# Patient Record
Sex: FEMALE | Race: WHITE | URBAN - NONMETROPOLITAN AREA
[De-identification: names, ages, dates, MRNs, and addresses within clinical notes are randomized per-mention and may not be internally consistent; named-entity substitution may affect disease eponyms.]

---
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
## 2024-01-17 ENCOUNTER — LAB OUTSIDE AN ENCOUNTER (OUTPATIENT)
Dept: URBAN - NONMETROPOLITAN AREA CLINIC 2 | Facility: CLINIC | Age: 19
End: 2024-01-17

## 2024-01-17 ENCOUNTER — DASHBOARD ENCOUNTERS (OUTPATIENT)
Age: 19
End: 2024-01-17

## 2024-01-17 ENCOUNTER — OFFICE VISIT (OUTPATIENT)
Dept: URBAN - NONMETROPOLITAN AREA CLINIC 2 | Facility: CLINIC | Age: 19
End: 2024-01-17
Payer: COMMERCIAL

## 2024-01-17 VITALS
HEART RATE: 92 BPM | HEIGHT: 62 IN | WEIGHT: 138 LBS | BODY MASS INDEX: 25.4 KG/M2 | SYSTOLIC BLOOD PRESSURE: 123 MMHG | DIASTOLIC BLOOD PRESSURE: 81 MMHG

## 2024-01-17 DIAGNOSIS — R10.12 LUQ PAIN: ICD-10-CM

## 2024-01-17 DIAGNOSIS — R11.0 NAUSEA: ICD-10-CM

## 2024-01-17 DIAGNOSIS — R17 ELEVATED BILIRUBIN: ICD-10-CM

## 2024-01-17 PROBLEM — 422587007: Status: ACTIVE | Noted: 2023-08-14

## 2024-01-17 PROBLEM — 26165005: Status: ACTIVE | Noted: 2023-08-14

## 2024-01-17 PROBLEM — 301715003: Status: ACTIVE | Noted: 2023-08-14

## 2024-01-17 PROCEDURE — 99213 OFFICE O/P EST LOW 20 MIN: CPT

## 2024-01-17 RX ORDER — PANTOPRAZOLE SODIUM 20 MG/1
1 TABLET TABLET, DELAYED RELEASE ORAL ONCE A DAY
Qty: 90 TABLET | Refills: 3 | OUTPATIENT
Start: 2024-01-17

## 2024-01-17 RX ORDER — FAMOTIDINE 10 MG/1
1 TABLET AT BEDTIME AS NEEDED TABLET, FILM COATED ORAL ONCE A DAY
Status: ACTIVE | COMMUNITY
Start: 2023-08-14

## 2024-01-17 RX ORDER — IRON 18 MG
0.5 TABLET TABLET ORAL
Status: ACTIVE | COMMUNITY
Start: 2023-08-14

## 2024-01-17 RX ORDER — MONTELUKAST SODIUM 10 MG/1
TABLET ORAL
Qty: 90 TABLET | Status: ACTIVE | COMMUNITY

## 2024-01-17 RX ORDER — NORETHINDRONE 0.35 MG/1
TAKE 1 TABLET BY MOUTH ONCE DAILY TABLET ORAL
Qty: 28 EACH | Refills: 3 | Status: ON HOLD | COMMUNITY

## 2024-01-17 RX ORDER — CHOLECALCIFEROL (VITAMIN D3) 50 MCG
1 TABLET TABLET ORAL ONCE A DAY
Status: ACTIVE | COMMUNITY

## 2024-01-17 RX ORDER — SALICYLIC ACID 3 G/100G
1 TABLET SWALLOW WHOLE WITH WATER; DO NOT TAKE WITH FRUIT JUICES LOTION TOPICAL ONCE A DAY
Status: ACTIVE | COMMUNITY

## 2024-01-17 RX ORDER — CHLORHEXIDINE GLUCONATE 4 %
1 TABLET AS NEEDED LIQUID (ML) TOPICAL ONCE A DAY
Status: ACTIVE | COMMUNITY

## 2024-01-17 RX ORDER — ESCITALOPRAM OXALATE 10 MG/1
TAKE ONE AND ONE-HALF TABLETS BY MOUTH EVERY MORNING TABLET ORAL
Qty: 135 UNSPECIFIED | Refills: 0 | Status: ACTIVE | COMMUNITY

## 2024-01-17 RX ORDER — ALBUTEROL SULFATE 90 UG/1
INHALE 2-4 PUFFS BY MOUTH EVERY 4 TO 6 HOURS AS NEEDED FOR COUGH , FOR WHEEZING, CHEST TIGHT AEROSOL, METERED RESPIRATORY (INHALATION)
Qty: 8.5 UNSPECIFIED | Refills: 0 | Status: ACTIVE | COMMUNITY

## 2024-01-17 RX ORDER — DEXTROAMPHETAMINE SACCHARATE, AMPHETAMINE ASPARTATE, DEXTROAMPHETAMINE SULFATE AND AMPHETAMINE SULFATE 2.5; 2.5; 2.5; 2.5 MG/1; MG/1; MG/1; MG/1
TAKE ONE TABLET BY MOUTH EVERY MORNING TABLET ORAL
Qty: 30 UNSPECIFIED | Refills: 0 | Status: ACTIVE | COMMUNITY

## 2024-01-17 NOTE — HPI-TODAY'S VISIT:
8/14/23 Nataly presents for evaluation of elevated bilirubin referred by  Beatriz Robertson NP.  A copy of this note and recommendations will be forwarded to her office. She  notes elevated bilirubin on labwork  12/2022 1.7 and 2.2 on 3/2023 No prior imaging of abdomen or fractionated bilirubin. She reports 4 months of medication adjustments after new oral contraception believed to have left her with no appetite and increased nausea/anxiety 20 lbs weight loss. She is off OCT now and gained back 10 lbs.  At times she is nauseated when she tries to eat but feels this is also her anxiety. She reports some episode with loose stool, now resolved, 1 month ago after a trip to [place].  She has 1-3 regular bowel movements daily now. She is taking famotidine 10 mg daily for acid suppression. She has a LUQ pain that is intermittent, random with now triggers.  Denies dysphagia, odynophagia or any observance of melena. She does endorse use of marijuana, no NSAIDs, tobacco use EtOH or other drugs. She denies any family history of gastrointestinal cancers. Works full time at [employer], owns many reptiles including bearded dragons and snakes. SP  10/18/2023 Nataly returns to clinic for follow up with elevated bilirubin. Her labs have normalized and she had US of RUQ showing GB sludge only. She has improved symptoms after switching oral contraception and increasing her famotidine. She feels much better today and will return prn, continue management with her pediatric office. SP 1/17/2024 Gene returns to clinic for follow-up with left upper quadrant pain.  Previously she had decided to return as needed however 2 months ago she had a return of these intermittent episodes.  She is not able to track it with any dietary flares.  She did just recently have lab work done with her OB/GYN.  She is no longer working at [employer], lost her job because her anxiety was limiting her from being able to attend work regularly.  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 Previously her nausea had improved however she has been adjusting when she takes Lexapro and is doing well taking it at night but does feel some nausea and will vomit if she eats too early in the mornings.  She has not lost weight, has returned to her baseline weight. Today we discussed completing gallbladder workup with HIDA scan and starting a proton pump inhibitor for possible gastritis or peptic ulcer.  If she does not improve we will discuss EGD with biopsies. ESTEFANY

## 2024-01-18 ENCOUNTER — TELEPHONE ENCOUNTER (OUTPATIENT)
Dept: URBAN - NONMETROPOLITAN AREA CLINIC 2 | Facility: CLINIC | Age: 19
End: 2024-01-18

## 2024-01-29 ENCOUNTER — TELEPHONE ENCOUNTER (OUTPATIENT)
Dept: URBAN - NONMETROPOLITAN AREA CLINIC 2 | Facility: CLINIC | Age: 19
End: 2024-01-29

## 2024-03-20 ENCOUNTER — OV EP (OUTPATIENT)
Dept: URBAN - NONMETROPOLITAN AREA CLINIC 2 | Facility: CLINIC | Age: 19
End: 2024-03-20

## 2024-03-20 RX ORDER — ESCITALOPRAM OXALATE 10 MG/1
TAKE ONE AND ONE-HALF TABLETS BY MOUTH EVERY MORNING TABLET ORAL
Qty: 135 UNSPECIFIED | Refills: 0 | Status: ACTIVE | COMMUNITY

## 2024-03-20 RX ORDER — NORETHINDRONE 0.35 MG/1
TAKE 1 TABLET BY MOUTH ONCE DAILY TABLET ORAL
Qty: 28 EACH | Refills: 3 | Status: ON HOLD | COMMUNITY

## 2024-03-20 RX ORDER — PANTOPRAZOLE SODIUM 20 MG/1
1 TABLET TABLET, DELAYED RELEASE ORAL ONCE A DAY
Qty: 90 TABLET | Refills: 3 | Status: ACTIVE | COMMUNITY
Start: 2024-01-17

## 2024-03-20 RX ORDER — SALICYLIC ACID 3 G/100G
1 TABLET SWALLOW WHOLE WITH WATER; DO NOT TAKE WITH FRUIT JUICES LOTION TOPICAL ONCE A DAY
Status: ACTIVE | COMMUNITY

## 2024-03-20 RX ORDER — DEXTROAMPHETAMINE SACCHARATE, AMPHETAMINE ASPARTATE, DEXTROAMPHETAMINE SULFATE AND AMPHETAMINE SULFATE 2.5; 2.5; 2.5; 2.5 MG/1; MG/1; MG/1; MG/1
TAKE ONE TABLET BY MOUTH EVERY MORNING TABLET ORAL
Qty: 30 UNSPECIFIED | Refills: 0 | Status: ACTIVE | COMMUNITY

## 2024-03-20 RX ORDER — CHOLECALCIFEROL (VITAMIN D3) 50 MCG
1 TABLET TABLET ORAL ONCE A DAY
Status: ACTIVE | COMMUNITY

## 2024-03-20 RX ORDER — FAMOTIDINE 10 MG/1
1 TABLET AT BEDTIME AS NEEDED TABLET, FILM COATED ORAL ONCE A DAY
Status: ACTIVE | COMMUNITY
Start: 2023-08-14

## 2024-03-20 RX ORDER — CHLORHEXIDINE GLUCONATE 4 %
1 TABLET AS NEEDED LIQUID (ML) TOPICAL ONCE A DAY
Status: ACTIVE | COMMUNITY

## 2024-03-20 RX ORDER — ALBUTEROL SULFATE 90 UG/1
INHALE 2-4 PUFFS BY MOUTH EVERY 4 TO 6 HOURS AS NEEDED FOR COUGH , FOR WHEEZING, CHEST TIGHT AEROSOL, METERED RESPIRATORY (INHALATION)
Qty: 8.5 UNSPECIFIED | Refills: 0 | Status: ACTIVE | COMMUNITY

## 2024-03-20 RX ORDER — IRON 18 MG
0.5 TABLET TABLET ORAL
Status: ACTIVE | COMMUNITY
Start: 2023-08-14

## 2024-03-20 RX ORDER — MONTELUKAST SODIUM 10 MG/1
TABLET ORAL
Qty: 90 TABLET | Status: ACTIVE | COMMUNITY

## 2024-03-20 NOTE — HPI-TODAY'S VISIT:
8/14/23 Nataly presents for evaluation of elevated bilirubin referred by  Beatriz Robertson NP.  A copy of this note and recommendations will be forwarded to her office. She  notes elevated bilirubin on labwork  12/2022 1.7 and 2.2 on 3/2023 No prior imaging of abdomen or fractionated bilirubin. She reports 4 months of medication adjustments after new oral contraception believed to have left her with no appetite and increased nausea/anxiety 20 lbs weight loss. She is off OCT now and gained back 10 lbs.  At times she is nauseated when she tries to eat but feels this is also her anxiety. She reports some episode with loose stool, now resolved, 1 month ago after a trip to [place].  She has 1-3 regular bowel movements daily now. She is taking famotidine 10 mg daily for acid suppression. She has a LUQ pain that is intermittent, random with now triggers.  Denies dysphagia, odynophagia or any observance of melena. She does endorse use of marijuana, no NSAIDs, tobacco use EtOH or other drugs. She denies any family history of gastrointestinal cancers. Works full time at [employer], owns many reptiles including bearded dragons and snakes. SP  10/18/2023 Nataly returns to clinic for follow up with elevated bilirubin. Her labs have normalized and she had US of RUQ showing GB sludge only. She has improved symptoms after switching oral contraception and increasing her famotidine. She feels much better today and will return prn, continue management with her pediatric office. SP 1/17/2024 Gene returns to clinic for follow-up with left upper quadrant pain.  Previously she had decided to return as needed however 2 months ago she had a return of these intermittent episodes.  She is not able to track it with any dietary flares.  She did just recently have lab work done with her OB/GYN.  She is no longer working at [employer], lost her job because her anxiety was limiting her from being able to attend work regularly.  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 Previously her nausea had improved however she has been adjusting when she takes Lexapro and is doing well taking it at night but does feel some nausea and will vomit if she eats too early in the mornings.  She has not lost weight, has returned to her baseline weight. Today we discussed completing gallbladder workup with HIDA scan and starting a proton pump inhibitor for possible gastritis or peptic ulcer.  If she does not improve we will discuss EGD with biopsies. SP 3/20/2024 follow-up ingestion Stratus.  GI symptoms including pain since starting pantoprazole 20 mg daily.  Denies any concerns with her bowel habits.  Overall she feels much better.  She also back home since her job transition out of this fall and stress management bread stating the veterinary staff discussed continuing her medicine and discussing weaning at a 3-month follow-up.  Will with GERD lifestyle recommendations.  Weight continues stable

## 2024-06-21 ENCOUNTER — OFFICE VISIT (OUTPATIENT)
Dept: URBAN - NONMETROPOLITAN AREA CLINIC 13 | Facility: CLINIC | Age: 19
End: 2024-06-21
Payer: COMMERCIAL

## 2024-06-21 VITALS
HEIGHT: 62 IN | HEART RATE: 78 BPM | SYSTOLIC BLOOD PRESSURE: 109 MMHG | BODY MASS INDEX: 26.65 KG/M2 | DIASTOLIC BLOOD PRESSURE: 74 MMHG | WEIGHT: 144.8 LBS

## 2024-06-21 DIAGNOSIS — R11.0 NAUSEA: ICD-10-CM

## 2024-06-21 DIAGNOSIS — R10.12 LUQ PAIN: ICD-10-CM

## 2024-06-21 DIAGNOSIS — R17 ELEVATED BILIRUBIN: ICD-10-CM

## 2024-06-21 PROCEDURE — 99213 OFFICE O/P EST LOW 20 MIN: CPT

## 2024-06-21 RX ORDER — IRON 18 MG
0.5 TABLET TABLET ORAL
Status: ACTIVE | COMMUNITY
Start: 2023-08-14

## 2024-06-21 RX ORDER — MONTELUKAST SODIUM 10 MG/1
TABLET ORAL
Qty: 90 TABLET | Status: ACTIVE | COMMUNITY

## 2024-06-21 RX ORDER — ALBUTEROL SULFATE 90 UG/1
INHALE 2-4 PUFFS BY MOUTH EVERY 4 TO 6 HOURS AS NEEDED FOR COUGH , FOR WHEEZING, CHEST TIGHT AEROSOL, METERED RESPIRATORY (INHALATION)
Qty: 8.5 UNSPECIFIED | Refills: 0 | Status: ACTIVE | COMMUNITY

## 2024-06-21 RX ORDER — NORETHINDRONE 0.35 MG/1
TAKE ONE TABLET BY MOUTH ONE TIME DAILY TABLET ORAL
Qty: 28 UNSPECIFIED | Refills: 1 | Status: ACTIVE | COMMUNITY

## 2024-06-21 RX ORDER — SALICYLIC ACID 3 G/100G
1 TABLET SWALLOW WHOLE WITH WATER; DO NOT TAKE WITH FRUIT JUICES LOTION TOPICAL ONCE A DAY
Status: ACTIVE | COMMUNITY

## 2024-06-21 RX ORDER — CHOLECALCIFEROL (VITAMIN D3) 50 MCG
1 TABLET TABLET ORAL ONCE A DAY
Status: ACTIVE | COMMUNITY

## 2024-06-21 RX ORDER — CHLORHEXIDINE GLUCONATE 4 %
1 TABLET AS NEEDED LIQUID (ML) TOPICAL ONCE A DAY
Status: ACTIVE | COMMUNITY

## 2024-06-21 RX ORDER — ESCITALOPRAM OXALATE 10 MG/1
TAKE ONE AND ONE-HALF TABLETS BY MOUTH EVERY MORNING TABLET ORAL
Qty: 135 UNSPECIFIED | Refills: 0 | Status: ACTIVE | COMMUNITY

## 2024-06-21 RX ORDER — NORETHINDRONE 0.35 MG/1
TAKE 1 TABLET BY MOUTH ONCE DAILY TABLET ORAL
Qty: 28 EACH | Refills: 3 | Status: ON HOLD | COMMUNITY

## 2024-06-21 RX ORDER — ESCITALOPRAM OXALATE 10 MG/1
1.5 TABLET TABLET, FILM COATED ORAL ONCE A DAY
Status: ACTIVE | COMMUNITY
Start: 2024-06-21

## 2024-06-21 RX ORDER — PANTOPRAZOLE SODIUM 20 MG/1
1 TABLET TABLET, DELAYED RELEASE ORAL ONCE A DAY
Qty: 90 TABLET | Refills: 3 | OUTPATIENT

## 2024-06-21 RX ORDER — PANTOPRAZOLE SODIUM 20 MG/1
1 TABLET TABLET, DELAYED RELEASE ORAL ONCE A DAY
Qty: 90 TABLET | Refills: 3 | Status: ACTIVE | COMMUNITY
Start: 2024-01-17

## 2024-06-21 NOTE — HPI-TODAY'S VISIT:
8/14/23 Nataly presents for evaluation of elevated bilirubin referred by  Beatriz Robertson NP.  A copy of this note and recommendations will be forwarded to her office. She  notes elevated bilirubin on labwork  12/2022 1.7 and 2.2 on 3/2023 No prior imaging of abdomen or fractionated bilirubin. She reports 4 months of medication adjustments after new oral contraception believed to have left her with no appetite and increased nausea/anxiety 20 lbs weight loss. She is off OCT now and gained back 10 lbs.  At times she is nauseated when she tries to eat but feels this is also her anxiety. She reports some episode with loose stool, now resolved, 1 month ago after a trip to [place].  She has 1-3 regular bowel movements daily now. She is taking famotidine 10 mg daily for acid suppression. She has a LUQ pain that is intermittent, random with now triggers.  Denies dysphagia, odynophagia or any observance of melena. She does endorse use of marijuana, no NSAIDs, tobacco use EtOH or other drugs. She denies any family history of gastrointestinal cancers. Works full time at [employer], owns many reptiles including bearded dragons and snakes. SP  10/18/2023 Nataly returns to clinic for follow up with elevated bilirubin. Her labs have normalized and she had US of RUQ showing GB sludge only. She has improved symptoms after switching oral contraception and increasing her famotidine. She feels much better today and will return prn, continue management with her pediatric office. SP 1/17/2024 Gene returns to clinic for follow-up with left upper quadrant pain.  Previously she had decided to return as needed however 2 months ago she had a return of these intermittent episodes.  She is not able to track it with any dietary flares.  She did just recently have lab work done with her OB/GYN.  She is no longer working at [employer], lost her job because her anxiety was limiting her from being able to attend work regularly.  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 Previously her nausea had improved however she has been adjusting when she takes Lexapro and is doing well taking it at night but does feel some nausea and will vomit if she eats too early in the mornings.  She has not lost weight, has returned to her baseline weight. Today we discussed completing gallbladder workup with HIDA scan and starting a proton pump inhibitor for possible gastritis or peptic ulcer.  If she does not improve we will discuss EGD with biopsies. ESTEFANY 3/20/2024 follow-up ingestion Stratus.  GI symptoms including pain since starting pantoprazole 20 mg daily.  Denies any concerns with her bowel habits.  Overall she feels much better.  She also back home since her job transition out of this fall and stress management bread stating the veterinary staff discussed continuing her medicine and discussing weaning at a 3-month follow-up.  Will with GERD lifestyle recommendations.  Weight continues stable 6/21/2024 Green returns to clinic for follow-up for indigestion.  She states overall her symptoms have improved.  She is feeling better than when we first saw her.  She continues with some mid to upper left-sided pain which is occasional and random.  She has not identified a trigger other than anxiety.  She does continue on Lexapro and we discussed the potential for adding amitriptyline however there is concern for serotonin syndrome.  She will discuss with Beatriz rojas of her primary provider. Her reflux is well-managed and her nausea has improved since she reduced her oral iron supplement.  Her HIDA scan was normal.  Today we discussed continuing pantoprazole and returning in 2 months at that point she will consider whether she would like to proceed with a EGD for further evaluation of her discomfort.  Possibly in that period of time we may be able to start amitriptyline. ESTEFANY

## 2024-06-23 ENCOUNTER — WEB ENCOUNTER (OUTPATIENT)
Dept: URBAN - NONMETROPOLITAN AREA CLINIC 13 | Facility: CLINIC | Age: 19
End: 2024-06-23

## 2024-06-23 RX ORDER — AMITRIPTYLINE HYDROCHLORIDE 10 MG/1
1 TABLET AT BEDTIME TABLET, FILM COATED ORAL ONCE A DAY
Qty: 90 TABLET | Refills: 3 | OUTPATIENT
Start: 2024-06-27

## 2024-08-23 ENCOUNTER — OFFICE VISIT (OUTPATIENT)
Dept: URBAN - NONMETROPOLITAN AREA CLINIC 13 | Facility: CLINIC | Age: 19
End: 2024-08-23
Payer: COMMERCIAL

## 2024-08-23 VITALS
WEIGHT: 130.8 LBS | DIASTOLIC BLOOD PRESSURE: 81 MMHG | HEIGHT: 62 IN | BODY MASS INDEX: 24.07 KG/M2 | SYSTOLIC BLOOD PRESSURE: 126 MMHG | HEART RATE: 62 BPM

## 2024-08-23 DIAGNOSIS — R11.0 NAUSEA: ICD-10-CM

## 2024-08-23 DIAGNOSIS — R17 ELEVATED BILIRUBIN: ICD-10-CM

## 2024-08-23 DIAGNOSIS — R10.12 LUQ PAIN: ICD-10-CM

## 2024-08-23 PROCEDURE — 99213 OFFICE O/P EST LOW 20 MIN: CPT

## 2024-08-23 RX ORDER — CHOLECALCIFEROL (VITAMIN D3) 50 MCG
1 TABLET TABLET ORAL ONCE A DAY
Status: ACTIVE | COMMUNITY

## 2024-08-23 RX ORDER — ESCITALOPRAM OXALATE 10 MG/1
TAKE ONE AND ONE-HALF TABLETS BY MOUTH EVERY MORNING TABLET ORAL
Qty: 135 UNSPECIFIED | Refills: 0 | Status: ACTIVE | COMMUNITY

## 2024-08-23 RX ORDER — SALICYLIC ACID 3 G/100G
1 TABLET SWALLOW WHOLE WITH WATER; DO NOT TAKE WITH FRUIT JUICES LOTION TOPICAL ONCE A DAY
Status: ACTIVE | COMMUNITY

## 2024-08-23 RX ORDER — AMITRIPTYLINE HYDROCHLORIDE 10 MG/1
1 TABLET AT BEDTIME TABLET, FILM COATED ORAL ONCE A DAY
Qty: 90 TABLET | Refills: 3 | Status: ACTIVE | COMMUNITY
Start: 2024-06-27

## 2024-08-23 RX ORDER — NORETHINDRONE 0.35 MG/1
TAKE ONE TABLET BY MOUTH ONE TIME DAILY TABLET ORAL
Qty: 28 UNSPECIFIED | Refills: 1 | Status: ACTIVE | COMMUNITY

## 2024-08-23 RX ORDER — PANTOPRAZOLE SODIUM 20 MG/1
1 TABLET TABLET, DELAYED RELEASE ORAL ONCE A DAY
Qty: 90 TABLET | Refills: 3 | Status: ACTIVE | COMMUNITY

## 2024-08-23 RX ORDER — ALBUTEROL SULFATE 90 UG/1
INHALE 2-4 PUFFS BY MOUTH EVERY 4 TO 6 HOURS AS NEEDED FOR COUGH , FOR WHEEZING, CHEST TIGHT AEROSOL, METERED RESPIRATORY (INHALATION)
Qty: 8.5 UNSPECIFIED | Refills: 0 | Status: ACTIVE | COMMUNITY

## 2024-08-23 RX ORDER — HYDROXYZINE HYDROCHLORIDE 25 MG/1
TAKE ONE TABLET BY MOUTH TWO TIMES A DAY AS NEEDED TABLET, FILM COATED ORAL
Qty: 60 UNSPECIFIED | Refills: 0 | Status: ACTIVE | COMMUNITY

## 2024-08-23 RX ORDER — IRON 18 MG
0.5 TABLET TABLET ORAL
Status: ACTIVE | COMMUNITY
Start: 2023-08-14

## 2024-08-23 RX ORDER — MONTELUKAST SODIUM 10 MG/1
TABLET ORAL
Qty: 90 TABLET | Status: ACTIVE | COMMUNITY

## 2024-08-23 RX ORDER — NORETHINDRONE 0.35 MG/1
TAKE 1 TABLET BY MOUTH ONCE DAILY TABLET ORAL
Qty: 28 EACH | Refills: 3 | Status: ON HOLD | COMMUNITY

## 2024-08-23 RX ORDER — CHLORHEXIDINE GLUCONATE 4 %
1 TABLET AS NEEDED LIQUID (ML) TOPICAL ONCE A DAY
Status: ACTIVE | COMMUNITY

## 2024-08-23 RX ORDER — PANTOPRAZOLE SODIUM 20 MG/1
1 TABLET TABLET, DELAYED RELEASE ORAL ONCE A DAY
Qty: 90 TABLET | Refills: 3 | OUTPATIENT

## 2024-08-23 NOTE — HPI-TODAY'S VISIT:
8/14/23 Nataly presents for evaluation of elevated bilirubin referred by  Beatriz Robertson NP.  A copy of this note and recommendations will be forwarded to her office. She  notes elevated bilirubin on labwork  12/2022 1.7 and 2.2 on 3/2023 No prior imaging of abdomen or fractionated bilirubin. She reports 4 months of medication adjustments after new oral contraception believed to have left her with no appetite and increased nausea/anxiety 20 lbs weight loss. She is off OCT now and gained back 10 lbs.  At times she is nauseated when she tries to eat but feels this is also her anxiety. She reports some episode with loose stool, now resolved, 1 month ago after a trip to [place].  She has 1-3 regular bowel movements daily now. She is taking famotidine 10 mg daily for acid suppression. She has a LUQ pain that is intermittent, random with now triggers.  Denies dysphagia, odynophagia or any observance of melena. She does endorse use of marijuana, no NSAIDs, tobacco use EtOH or other drugs. She denies any family history of gastrointestinal cancers. Works full time at [employer], owns many reptiles including bearded dragons and snakes. SP  10/18/2023 Nataly returns to clinic for follow up with elevated bilirubin. Her labs have normalized and she had US of RUQ showing GB sludge only. She has improved symptoms after switching oral contraception and increasing her famotidine. She feels much better today and will return prn, continue management with her pediatric office. SP 1/17/2024 Gene returns to clinic for follow-up with left upper quadrant pain.  Previously she had decided to return as needed however 2 months ago she had a return of these intermittent episodes.  She is not able to track it with any dietary flares.  She did just recently have lab work done with her OB/GYN.  She is no longer working at [employer], lost her job because her anxiety was limiting her from being able to attend work regularly.  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 Previously her nausea had improved however she has been adjusting when she takes Lexapro and is doing well taking it at night but does feel some nausea and will vomit if she eats too early in the mornings.  She has not lost weight, has returned to her baseline weight. Today we discussed completing gallbladder workup with HIDA scan and starting a proton pump inhibitor for possible gastritis or peptic ulcer.  If she does not improve we will discuss EGD with biopsies. SP 3/20/2024 follow-up ingestion Stratus.  GI symptoms including pain since starting pantoprazole 20 mg daily.  Denies any concerns with her bowel habits.  Overall she feels much better.  She also back home since her job transition out of this fall and stress management bread stating the veterinary staff discussed continuing her medicine and discussing weaning at a 3-month follow-up.  Will with GERD lifestyle recommendations.  Weight continues stable 6/21/2024 Green returns to clinic for follow-up for indigestion.  She states overall her symptoms have improved.  She is feeling better than when we first saw her.  She continues with some mid to upper left-sided pain which is occasional and random.  She has not identified a trigger other than anxiety.  She does continue on Lexapro and we discussed the potential for adding amitriptyline however there is concern for serotonin syndrome.  She will discuss with Beatriz rojas of her primary provider. Her reflux is well-managed and her nausea has improved since she reduced her oral iron supplement.  Her HIDA scan was normal.  Today we discussed continuing pantoprazole and returning in 2 months at that point she will consider whether she would like to proceed with a EGD for further evaluation of her discomfort.  Possibly in that period of time we may be able to start amitriptyline. SP  8/23/24 Nataly returns for follow up. She only has rare abdominal pains and they are for only seconds when occuring. This has improved progressively since being on Zoloft with AMT> She reports improved anxiety as well. SHe is on Rinvoq for ezcema now as well. She continues pantoprazole 20 mg daily, denies nausea reflux or trouble swallowing. Her weight is maintaining at 130. BMs are daily and normal. Discussed utility of EGD at this time and the thought of anesthesia causes her increased anxiety and fear, deciding to hold off for now. She will returnin 6 m and discuss weaning off PPI.

## 2024-08-25 ENCOUNTER — WEB ENCOUNTER (OUTPATIENT)
Dept: URBAN - NONMETROPOLITAN AREA CLINIC 13 | Facility: CLINIC | Age: 19
End: 2024-08-25

## 2025-02-21 ENCOUNTER — OFFICE VISIT (OUTPATIENT)
Dept: URBAN - NONMETROPOLITAN AREA CLINIC 13 | Facility: CLINIC | Age: 20
End: 2025-02-21
Payer: COMMERCIAL

## 2025-02-21 VITALS
HEIGHT: 62 IN | DIASTOLIC BLOOD PRESSURE: 91 MMHG | SYSTOLIC BLOOD PRESSURE: 133 MMHG | WEIGHT: 140 LBS | BODY MASS INDEX: 25.76 KG/M2 | HEART RATE: 105 BPM

## 2025-02-21 DIAGNOSIS — R17 ELEVATED BILIRUBIN: ICD-10-CM

## 2025-02-21 DIAGNOSIS — R11.0 NAUSEA: ICD-10-CM

## 2025-02-21 DIAGNOSIS — R10.12 LUQ PAIN: ICD-10-CM

## 2025-02-21 PROCEDURE — 99212 OFFICE O/P EST SF 10 MIN: CPT

## 2025-02-21 RX ORDER — ESCITALOPRAM OXALATE 10 MG/1
TAKE ONE AND ONE-HALF TABLETS BY MOUTH EVERY MORNING TABLET ORAL
Qty: 135 UNSPECIFIED | Refills: 0 | Status: ACTIVE | COMMUNITY

## 2025-02-21 RX ORDER — FAMOTIDINE 40 MG/1
1 TABLET AT BEDTIME TABLET, FILM COATED ORAL ONCE A DAY
Qty: 90 TABLET | Refills: 3 | OUTPATIENT
Start: 2025-02-21

## 2025-02-21 RX ORDER — AMITRIPTYLINE HYDROCHLORIDE 10 MG/1
1 TABLET AT BEDTIME TABLET, FILM COATED ORAL ONCE A DAY
Qty: 90 TABLET | Refills: 3 | Status: ACTIVE | COMMUNITY
Start: 2024-06-27

## 2025-02-21 RX ORDER — CHOLECALCIFEROL (VITAMIN D3) 50 MCG
1 TABLET TABLET ORAL ONCE A DAY
Status: ACTIVE | COMMUNITY

## 2025-02-21 RX ORDER — NORETHINDRONE 0.35 MG/1
TAKE ONE TABLET BY MOUTH ONE TIME DAILY TABLET ORAL
Qty: 28 UNSPECIFIED | Refills: 1 | Status: ACTIVE | COMMUNITY

## 2025-02-21 RX ORDER — ALBUTEROL SULFATE 90 UG/1
INHALE 2-4 PUFFS BY MOUTH EVERY 4 TO 6 HOURS AS NEEDED FOR COUGH , FOR WHEEZING, CHEST TIGHT AEROSOL, METERED RESPIRATORY (INHALATION)
Qty: 8.5 UNSPECIFIED | Refills: 0 | Status: ACTIVE | COMMUNITY

## 2025-02-21 RX ORDER — HYDROXYZINE HYDROCHLORIDE 25 MG/1
TAKE ONE TABLET BY MOUTH TWO TIMES A DAY AS NEEDED TABLET, FILM COATED ORAL
Qty: 60 UNSPECIFIED | Refills: 0 | Status: ACTIVE | COMMUNITY

## 2025-02-21 RX ORDER — MONTELUKAST SODIUM 10 MG/1
TABLET ORAL
Qty: 90 TABLET | Status: ACTIVE | COMMUNITY

## 2025-02-21 RX ORDER — IRON 18 MG
0.5 TABLET TABLET ORAL
Status: ACTIVE | COMMUNITY
Start: 2023-08-14

## 2025-02-21 RX ORDER — PANTOPRAZOLE SODIUM 20 MG/1
1 TABLET TABLET, DELAYED RELEASE ORAL ONCE A DAY
Qty: 90 TABLET | Refills: 3 | Status: ACTIVE | COMMUNITY

## 2025-02-21 RX ORDER — SALICYLIC ACID 3 G/100G
1 TABLET SWALLOW WHOLE WITH WATER; DO NOT TAKE WITH FRUIT JUICES LOTION TOPICAL ONCE A DAY
Status: ACTIVE | COMMUNITY

## 2025-02-21 RX ORDER — CHLORHEXIDINE GLUCONATE 4 %
1 TABLET AS NEEDED LIQUID (ML) TOPICAL ONCE A DAY
Status: ACTIVE | COMMUNITY

## 2025-02-21 NOTE — HPI-TODAY'S VISIT:
8/14/23 Nataly presents for evaluation of elevated bilirubin referred by  Beatriz Robertson NP.  A copy of this note and recommendations will be forwarded to her office. She  notes elevated bilirubin on labwork  12/2022 1.7 and 2.2 on 3/2023 No prior imaging of abdomen or fractionated bilirubin. She reports 4 months of medication adjustments after new oral contraception believed to have left her with no appetite and increased nausea/anxiety 20 lbs weight loss. She is off OCT now and gained back 10 lbs.  At times she is nauseated when she tries to eat but feels this is also her anxiety. She reports some episode with loose stool, now resolved, 1 month ago after a trip to [place].  She has 1-3 regular bowel movements daily now. She is taking famotidine 10 mg daily for acid suppression. She has a LUQ pain that is intermittent, random with now triggers.  Denies dysphagia, odynophagia or any observance of melena. She does endorse use of marijuana, no NSAIDs, tobacco use EtOH or other drugs. She denies any family history of gastrointestinal cancers. Works full time at [employer], owns many reptiles including bearded dragons and snakes. SP  10/18/2023 Nataly returns to clinic for follow up with elevated bilirubin. Her labs have normalized and she had US of RUQ showing GB sludge only. She has improved symptoms after switching oral contraception and increasing her famotidine. She feels much better today and will return prn, continue management with her pediatric office. SP 1/17/2024 Gene returns to clinic for follow-up with left upper quadrant pain.  Previously she had decided to return as needed however 2 months ago she had a return of these intermittent episodes.  She is not able to track it with any dietary flares.  She did just recently have lab work done with her OB/GYN.  She is no longer working at [employer], lost her job because her anxiety was limiting her from being able to attend work regularly.  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 Previously her nausea had improved however she has been adjusting when she takes Lexapro and is doing well taking it at night but does feel some nausea and will vomit if she eats too early in the mornings.  She has not lost weight, has returned to her baseline weight. Today we discussed completing gallbladder workup with HIDA scan and starting a proton pump inhibitor for possible gastritis or peptic ulcer.  If she does not improve we will discuss EGD with biopsies. SP 3/20/2024 follow-up ingestion Stratus.  GI symptoms including pain since starting pantoprazole 20 mg daily.  Denies any concerns with her bowel habits.  Overall she feels much better.  She also back home since her job transition out of this fall and stress management bread stating the veterinary staff discussed continuing her medicine and discussing weaning at a 3-month follow-up.  Will with GERD lifestyle recommendations.  Weight continues stable 6/21/2024 Green returns to clinic for follow-up for indigestion.  She states overall her symptoms have improved.  She is feeling better than when we first saw her.  She continues with some mid to upper left-sided pain which is occasional and random.  She has not identified a trigger other than anxiety.  She does continue on Lexapro and we discussed the potential for adding amitriptyline however there is concern for serotonin syndrome.  She will discuss with Beatriz rojas of her primary provider. Her reflux is well-managed and her nausea has improved since she reduced her oral iron supplement.  Her HIDA scan was normal.  Today we discussed continuing pantoprazole and returning in 2 months at that point she will consider whether she would like to proceed with a EGD for further evaluation of her discomfort.  Possibly in that period of time we may be able to start amitriptyline. SP  8/23/24 Nataly returns for follow up. She only has rare abdominal pains and they are for only seconds when occuring. This has improved progressively since being on Zoloft with AMT> She reports improved anxiety as well. SHe is on Rinvoq for ezcema now as well. She continues pantoprazole 20 mg daily, denies nausea reflux or trouble swallowing. Her weight is maintaining at 130. BMs are daily and normal. Discussed utility of EGD at this time and the thought of anesthesia causes her increased anxiety and fear, deciding to hold off for now. She will returnin 6 m and discuss weaning off PPI.  2/21/2025 Patient returns to clinic for follow-up with nausea and abdominal pain.  Today she states her GI symptoms are all much improved.  She really experiences this left upper quadrant pain.  It usually occurs immediately before a bowel movement and resolves after the bowel movement. She reports Kimble's stool scale 3-4.  She is not having any difficulty with her bowel habits.  Her nausea is well-controlled.  She is off all acid reduction medications.  She has balanced out her anxiety medication and is on a combination of Zoloft and amitriptyline.  She feels this is a good combination for her.  She feels the best she is ever felt.  We discussed trial of FD guard if she has any upper GI symptoms return.  Her weight is stable and she is having no blood in her stool.  She is not on her invoke any longer.  What they thought was eczema turned out to be a fungal issue. SP Medical Necessity Statement: Based on my medical judgement, Mohs surgery is the most appropriate treatment for this cancer compared to other treatments.

## 2025-02-24 ENCOUNTER — WEB ENCOUNTER (OUTPATIENT)
Dept: URBAN - NONMETROPOLITAN AREA CLINIC 13 | Facility: CLINIC | Age: 20
End: 2025-02-24

## 2025-07-30 ENCOUNTER — TELEPHONE ENCOUNTER (OUTPATIENT)
Dept: URBAN - NONMETROPOLITAN AREA CLINIC 2 | Facility: CLINIC | Age: 20
End: 2025-07-30

## 2025-08-22 ENCOUNTER — OFFICE VISIT (OUTPATIENT)
Dept: URBAN - NONMETROPOLITAN AREA CLINIC 13 | Facility: CLINIC | Age: 20
End: 2025-08-22
Payer: COMMERCIAL

## 2025-08-22 ENCOUNTER — OFFICE VISIT (OUTPATIENT)
Dept: URBAN - NONMETROPOLITAN AREA CLINIC 13 | Facility: CLINIC | Age: 20
End: 2025-08-22

## 2025-08-22 DIAGNOSIS — R11.0 NAUSEA: ICD-10-CM

## 2025-08-22 DIAGNOSIS — R10.12 LUQ PAIN: ICD-10-CM

## 2025-08-22 DIAGNOSIS — R17 ELEVATED BILIRUBIN: ICD-10-CM

## 2025-08-22 PROCEDURE — 99214 OFFICE O/P EST MOD 30 MIN: CPT | Performed by: NURSE PRACTITIONER

## 2025-08-22 RX ORDER — IRON 18 MG
0.5 TABLET TABLET ORAL
Status: ACTIVE | COMMUNITY
Start: 2023-08-14

## 2025-08-22 RX ORDER — FAMOTIDINE 40 MG/1
1 TABLET AT BEDTIME TABLET, FILM COATED ORAL ONCE A DAY
Qty: 90 TABLET | Refills: 3 | Status: ACTIVE | COMMUNITY
Start: 2025-02-21

## 2025-08-22 RX ORDER — SALICYLIC ACID 3 G/100G
1 TABLET SWALLOW WHOLE WITH WATER; DO NOT TAKE WITH FRUIT JUICES LOTION TOPICAL ONCE A DAY
Status: ACTIVE | COMMUNITY

## 2025-08-22 RX ORDER — CHOLECALCIFEROL (VITAMIN D3) 50 MCG
1 TABLET TABLET ORAL ONCE A DAY
Status: ACTIVE | COMMUNITY

## 2025-08-22 RX ORDER — MONTELUKAST SODIUM 10 MG/1
TABLET ORAL
Qty: 90 TABLET | Status: ACTIVE | COMMUNITY

## 2025-08-22 RX ORDER — NORETHINDRONE 0.35 MG/1
TAKE ONE TABLET BY MOUTH ONE TIME DAILY TABLET ORAL
Qty: 28 UNSPECIFIED | Refills: 1 | Status: ACTIVE | COMMUNITY

## 2025-08-22 RX ORDER — FAMOTIDINE 40 MG/1
1 TABLET AT BEDTIME TABLET, FILM COATED ORAL ONCE A DAY
Qty: 90 TABLET | Refills: 3 | OUTPATIENT
Start: 2025-08-22

## 2025-08-22 RX ORDER — AMITRIPTYLINE HYDROCHLORIDE 10 MG/1
1 TABLET AT BEDTIME TABLET, FILM COATED ORAL ONCE A DAY
Qty: 90 TABLET | Refills: 3
Start: 2024-06-27

## 2025-08-22 RX ORDER — ESCITALOPRAM OXALATE 10 MG/1
TAKE ONE AND ONE-HALF TABLETS BY MOUTH EVERY MORNING TABLET ORAL
Qty: 135 UNSPECIFIED | Refills: 0 | Status: ACTIVE | COMMUNITY

## 2025-08-22 RX ORDER — HYOSCYAMINE SULFATE 100 MG/1
1 TABLET UNDER THE TONGUE AND ALLOW TO DISSOLVE AS NEEDED TABLET ORAL THREE TIMES A DAY
Qty: 90 | Refills: 3 | OUTPATIENT
Start: 2025-08-22

## 2025-08-22 RX ORDER — CHLORHEXIDINE GLUCONATE 4 %
1 TABLET AS NEEDED LIQUID (ML) TOPICAL ONCE A DAY
Status: ACTIVE | COMMUNITY

## 2025-08-22 RX ORDER — ALBUTEROL SULFATE 90 UG/1
INHALE 2-4 PUFFS BY MOUTH EVERY 4 TO 6 HOURS AS NEEDED FOR COUGH , FOR WHEEZING, CHEST TIGHT AEROSOL, METERED RESPIRATORY (INHALATION)
Qty: 8.5 UNSPECIFIED | Refills: 0 | Status: ACTIVE | COMMUNITY

## 2025-08-22 RX ORDER — HYDROXYZINE HYDROCHLORIDE 25 MG/1
TAKE ONE TABLET BY MOUTH TWO TIMES A DAY AS NEEDED TABLET, FILM COATED ORAL
Qty: 60 UNSPECIFIED | Refills: 0 | Status: ACTIVE | COMMUNITY

## 2025-08-22 RX ORDER — AMITRIPTYLINE HYDROCHLORIDE 10 MG/1
1 TABLET AT BEDTIME TABLET, FILM COATED ORAL ONCE A DAY
Qty: 90 TABLET | Refills: 3 | Status: ACTIVE | COMMUNITY
Start: 2024-06-27

## 2025-08-22 RX ORDER — PANTOPRAZOLE SODIUM 20 MG/1
1 TABLET TABLET, DELAYED RELEASE ORAL ONCE A DAY
Qty: 90 TABLET | Refills: 3 | Status: ACTIVE | COMMUNITY